# Patient Record
Sex: MALE | NOT HISPANIC OR LATINO | URBAN - METROPOLITAN AREA
[De-identification: names, ages, dates, MRNs, and addresses within clinical notes are randomized per-mention and may not be internally consistent; named-entity substitution may affect disease eponyms.]

---

## 2017-07-17 ENCOUNTER — IMPORTED ENCOUNTER (OUTPATIENT)
Dept: URBAN - METROPOLITAN AREA CLINIC 1 | Facility: CLINIC | Age: 67
End: 2017-07-17

## 2017-07-17 PROBLEM — H52.03: Noted: 2017-07-17

## 2017-07-17 PROBLEM — H52.4: Noted: 2017-07-17

## 2017-07-17 PROCEDURE — S0621 ROUTINE OPHTHALMOLOGICAL EXA: HCPCS

## 2017-07-17 NOTE — PATIENT DISCUSSION
1.  Hyperopia: Rx was given for corrective spectacles if indicated. 2.  [id number]. Cataract OU 4. Dry Eyes w/ PEK OU Return for an appointment in 1 year 36 with Dr. Helder Hussein. Return for an appointment in 6 months 27 with Dr. Helder Hussein.

## 2018-01-25 ENCOUNTER — IMPORTED ENCOUNTER (OUTPATIENT)
Dept: URBAN - METROPOLITAN AREA CLINIC 1 | Facility: CLINIC | Age: 68
End: 2018-01-25

## 2018-01-25 PROBLEM — H25.813: Noted: 2018-01-25

## 2018-01-25 PROBLEM — H04.123: Noted: 2018-01-25

## 2018-01-25 PROCEDURE — 92014 COMPRE OPH EXAM EST PT 1/>: CPT

## 2018-01-25 NOTE — PATIENT DISCUSSION
1.  Cataract OU: Observe for now without intervention. The patient was advised to contact us if any change or worsening of vision2. Dry Eyes OU -- Recommended to patient to use Artificial Tears BID OU3. Return for an appointment in 1 year for 30. with Dr. Phyllis Bennett. 2.  Return for an appointment for 40 as scheduled with Dr. Phyllis Bennett.

## 2018-05-09 NOTE — PATIENT DISCUSSION
DISCUSSED WITH PATIENT SLOWER VISUAL RECOVERY AND INCREASED RISK OF NEEDING ENHANCEMENT WITH HYPEROPIC TREATMENTS

## 2018-05-14 NOTE — PATIENT DISCUSSION
HYPEROPIA, OU- DISC OPT OF REFRACTIVE [id number]. PT UNDERSTANDS OPTIONS AND DESIRES TO PROCEED WITH REFRACTIVE SX TO IMPROVE VA AND REDUCE DEPENDENCY ON GLS/CTLS.

## 2018-05-14 NOTE — PATIENT DISCUSSION
GLAUCOMA SUSPECT, OU ? OPTIC NERVE CUPPING/ASYMMETRY. IOP WNL , OU. OCT TODAY TO CHECK FOR RNFL THINNING. VISUAL FIELD NEXT VISIT TO CHECK FOR VISUAL FIELD LOSS.

## 2018-05-24 NOTE — PATIENT DISCUSSION
DISC W PT THAT THE NEED FOR READING GLASSES USUALLY OCCURS AT AGE FOR[age]. DISC EVEN POST LASIK, READING GLASSES WILL BE NEEDED AFTER PROCEDURE.

## 2018-05-24 NOTE — PATIENT DISCUSSION
Continue: Pred Forte (prednisolone acetate): drops,suspension: 1% 1 drop four times a day as directed into right eye

## 2018-05-24 NOTE — PATIENT DISCUSSION
PT UNDERSTANDS OPTIONS AND DESIRES TO PROCEED WITH LASIK OU TO IMPROVE VA AND REDUCE DEPENDENCY ON GLS/CTLS.

## 2018-07-18 ENCOUNTER — IMPORTED ENCOUNTER (OUTPATIENT)
Dept: URBAN - METROPOLITAN AREA CLINIC 1 | Facility: CLINIC | Age: 68
End: 2018-07-18

## 2018-07-18 PROBLEM — H52.03: Noted: 2018-07-18

## 2018-07-18 PROBLEM — H52.4: Noted: 2018-07-18

## 2018-07-18 PROCEDURE — S0621 ROUTINE OPHTHALMOLOGICAL EXA: HCPCS

## 2018-07-18 NOTE — PATIENT DISCUSSION
1.  Hyperopia: Rx was given for corrective spectacles if indicated. 2.  [id number]. Cataract OU 4. Dry Eyes w/ PEK OU Return for an appointment in 1 year 36 with Dr. Cassidy Price. Return for an appointment in 6 months 27 with Dr. Cassidy Price.

## 2019-01-23 ENCOUNTER — IMPORTED ENCOUNTER (OUTPATIENT)
Dept: URBAN - METROPOLITAN AREA CLINIC 1 | Facility: CLINIC | Age: 69
End: 2019-01-23

## 2019-01-23 PROBLEM — H25.813: Noted: 2019-01-23

## 2019-01-23 PROBLEM — H04.123: Noted: 2019-01-23

## 2019-01-23 PROBLEM — H16.143: Noted: 2019-01-23

## 2019-01-23 PROCEDURE — 92014 COMPRE OPH EXAM EST PT 1/>: CPT

## 2019-01-23 NOTE — PATIENT DISCUSSION
1.  Cataracts OU -- Observe for now without intervention. The patient was advised to contact us if any change or worsening of vision. 2. TIM w/ PEK OU -- Recommend continue the frequent use of OTC AT's BID-QID OU Routinely (Sample of Systane Complete Given). 3.  S/p Upper Lid Blepharoplasty OU (2015 / PMG) -- Doing well. Return for an appointment in 1 YR for a 30 OU with Dr. Liz Cohen. Return for an appointment in 6 MO for a 40 OU with Dr. Liz Cohen. Patient defers the refraction at today's visit (will return under [address] / Insurance).

## 2019-07-17 ENCOUNTER — IMPORTED ENCOUNTER (OUTPATIENT)
Dept: URBAN - METROPOLITAN AREA CLINIC 1 | Facility: CLINIC | Age: 69
End: 2019-07-17

## 2019-07-17 PROBLEM — H52.223: Noted: 2019-07-17

## 2019-07-17 PROBLEM — H52.4: Noted: 2019-07-17

## 2019-07-17 PROBLEM — H52.03: Noted: 2019-07-17

## 2019-07-17 PROCEDURE — S0621 ROUTINE OPHTHALMOLOGICAL EXA: HCPCS

## 2019-07-17 NOTE — PATIENT DISCUSSION
1.  Hyperopia OU -- Finalized Glasses MRx was given to patient today for corrective spectacles if indicated2. Astigmatism OU3. Presbyopia OU4. Cataracts OU -- Observe 5. TIM w/ PEK OU -- Recommend continue the frequent use of OTC AT's BID-QID OU Routinely 6. S/p Upper Lid Blepharoplasty OU (2015 / PMG) -- Doing wellReturn for an appointment in 1 YR for a 40 OU with Dr. Zackary Moss. Return as scheduled in January 2020 for 30 OU appointment with Dr. Zackary Moss.

## 2020-01-23 ENCOUNTER — IMPORTED ENCOUNTER (OUTPATIENT)
Dept: URBAN - METROPOLITAN AREA CLINIC 1 | Facility: CLINIC | Age: 70
End: 2020-01-23

## 2020-01-23 PROBLEM — H16.143: Noted: 2020-01-23

## 2020-01-23 PROBLEM — H04.123: Noted: 2020-01-23

## 2020-01-23 PROBLEM — H25.813: Noted: 2020-01-23

## 2020-01-23 PROBLEM — H10.45: Noted: 2020-01-23

## 2020-01-23 PROCEDURE — 92014 COMPRE OPH EXAM EST PT 1/>: CPT

## 2020-01-23 NOTE — PATIENT DISCUSSION
1.  Cataract OU -- Observe for now without intervention. The patient was advised to contact us if any change or worsening of vision2. TIM w/ PEK OU -- Recommend continue the frequent use of OTC AT's BID-QID OU Routinely. 3. Allergic Conjunctivitis OU -- Recommend Zaditor BID PRN for itching (Handout given). 3. S/p Upper Lid Blepharoplasty OU (2015 / PMG) -- Doing well. Patient defers Glasses MRx today. Patient to return under vision plan. Return for an appointment in 1 year for a 30/glare with Dr. Abdiel Mccann. Return for an appointment in July for a 36 with Dr. Abdiel Mccann.

## 2020-07-15 ENCOUNTER — IMPORTED ENCOUNTER (OUTPATIENT)
Dept: URBAN - METROPOLITAN AREA CLINIC 1 | Facility: CLINIC | Age: 70
End: 2020-07-15

## 2020-07-15 PROBLEM — H52.4: Noted: 2020-07-15

## 2020-07-15 PROBLEM — H52.223: Noted: 2020-07-15

## 2020-07-15 PROBLEM — H52.03: Noted: 2020-07-15

## 2020-07-15 PROCEDURE — S0621 ROUTINE OPHTHALMOLOGICAL EXA: HCPCS

## 2020-07-15 NOTE — PATIENT DISCUSSION
1.  Hyperopia -- 2. Presbyopia -- Rx was given for corrective spectacles if indicated. 3.  Cataract OU -- Observe for now without intervention. The patient was advised to contact us if any change or worsening of vision4. TIM w/ PEK OU -- Recommend continue the frequent use of OTC AT's BID-QID OU Routinely. 5. Allergic Conjunctivitis OU -- Recommend Zaditor BID PRN for itching (Handout given). 6. S/p Upper Lid Blepharoplasty OU (2015 / PMG) -- Doing well. Return for an appointment in 1 year for a 36 with Dr. Sarmad Nation.

## 2022-04-02 ASSESSMENT — VISUAL ACUITY
OD_CC: J1+
OS_GLARE: 20/50
OS_SC: 20/20
OS_SC: 20/20-1
OS_SC: 20/20
OD_GLARE: 20/50
OD_SC: 20/20
OD_SC: 20/20
OS_CC: J1+
OS_CC: J1+
OS_CC: J1
OD_GLARE: 20/50
OS_CC: J1+
OD_SC: 20/20
OS_SC: 20/20
OD_CC: J1+
OS_SC: 20/20
OD_CC: J1+
OD_CC: J1+
OS_SC: 20/20
OS_GLARE: 20/50
OD_SC: 20/20
OS_CC: J1+
OD_CC: J1+
OS_CC: J1+
OD_SC: 20/20
OD_CC: J1+
OS_CC: J1+
OS_GLARE: 20/50
OS_SC: 20/20
OD_SC: 20/20
OD_CC: J1
OD_SC: 20/20
OD_GLARE: 20/50

## 2022-04-02 ASSESSMENT — TONOMETRY
OD_IOP_MMHG: 16
OS_IOP_MMHG: 15
OS_IOP_MMHG: 17
OD_IOP_MMHG: 15
OD_IOP_MMHG: 17
OD_IOP_MMHG: 16
OS_IOP_MMHG: 17
OS_IOP_MMHG: 16
OS_IOP_MMHG: 16
OD_IOP_MMHG: 16
OS_IOP_MMHG: 16
OD_IOP_MMHG: 15
OS_IOP_MMHG: 16
OD_IOP_MMHG: 16

## 2022-04-02 ASSESSMENT — KERATOMETRY
OD_AXISANGLE2_DEGREES: 111
OS_K1POWER_DIOPTERS: 43.75
OS_K2POWER_DIOPTERS: 43.25
OS_AXISANGLE2_DEGREES: 068
OS_K2POWER_DIOPTERS: 43.50
OD_K1POWER_DIOPTERS: 44.75
OD_AXISANGLE_DEGREES: 022
OD_AXISANGLE_DEGREES: 021
OS_AXISANGLE2_DEGREES: 079
OS_AXISANGLE_DEGREES: 158
OS_K1POWER_DIOPTERS: 44.00
OD_K1POWER_DIOPTERS: 44.50
OD_K2POWER_DIOPTERS: 43.75
OS_AXISANGLE_DEGREES: 169
OD_K2POWER_DIOPTERS: 43.75
OD_AXISANGLE2_DEGREES: 112